# Patient Record
Sex: MALE | Race: WHITE | Employment: UNEMPLOYED | URBAN - NONMETROPOLITAN AREA
[De-identification: names, ages, dates, MRNs, and addresses within clinical notes are randomized per-mention and may not be internally consistent; named-entity substitution may affect disease eponyms.]

---

## 2020-02-12 ENCOUNTER — TELEPHONE (OUTPATIENT)
Dept: SURGERY | Age: 55
End: 2020-02-12

## 2020-02-12 NOTE — TELEPHONE ENCOUNTER
stool  [] [place]   [] Change in bowel habits    Do you have allergies? [x] Yes  If yes, please list: Benadryl  [] No    Do you take Warfarin, Coumadin, Plavix, Eliquis, Xarelto, or aspirin OR do you take a medication that thins your blood? [] Yes  [x] No    Please list all of your medications including over-the-counter and herbal supplements  zyrtec singulair                          List your past surgical procedures    Back surgery                            Medical History  Do you have any history of:  [] None [] Heart Disease [] Hypertension  [] Diabetes [] Seizures [] Respiratory/Asthma  [] Sleep Apnea [] G.E.R.D [] Blood Disorder  [] Vascular Disease [] Depression    List the medical problems you are being treated for    NO                            History of MRSA? NO        Have you check with your insurance company to see what you BENEFITS are id you have had a colonoscopy? If you have not check with them we encourage you to find this information out before the procedure. Below are codes you may need to five them.        CPT and Diagnosis: FOR OFFICE USE ONLY  36879 Diagnostic colonoscopy- All patients Symptoms (check above or list):   87877 Screening colonoscopy for NON-MEDICARE patients Diagnosis code: Z12.11   Screening colonoscopy for MEDICARE patients Diagnosis code: Z12.11   Screening colonoscopy for MEDICARE- HIGH RISK PATIENTS   Z85.038- Personal history malignant neoplasm, large intestine   Z85.048- Personal history malignant neoplasm, rectum/anus   Z86.010- Personal history colon polyps   Z80.0- Family history malignant neoplasm   Z83.79- Family history digestive disorder    Reviewed by nurse: Venu Henry FOR 03/27/2020        ADDITIONAL NOTES:

## 2020-02-26 RX ORDER — CETIRIZINE HYDROCHLORIDE 10 MG/1
10 TABLET ORAL DAILY
COMMUNITY

## 2020-02-26 RX ORDER — MONTELUKAST SODIUM 10 MG/1
10 TABLET ORAL NIGHTLY
COMMUNITY

## 2021-02-09 ENCOUNTER — TELEPHONE (OUTPATIENT)
Dept: SURGERY | Age: 56
End: 2021-02-09

## 2021-02-09 DIAGNOSIS — Z12.11 ENCOUNTER FOR SCREENING COLONOSCOPY: ICD-10-CM

## 2021-02-09 DIAGNOSIS — Z01.818 PRE-OP TESTING: Primary | ICD-10-CM

## 2021-02-09 NOTE — TELEPHONE ENCOUNTER
[facility] Colonoscopy Worksheet     Patient Name: Snow Stuart  : 1965  Primary Care Physician: No primary care provider on file. Today's Date: 2020     Surgery Location:   []? [place]     []? [facility]     [x]? [facility]         []? [address]     Why has a Colonoscopy been recommended for you? Never had one      To properly code your procedure we must ask the following questions. PLEASE CHECK ALL THAT APPLY.      Are you currently having any of the following symptoms?     []? Rectal Bleeding (K62.5)     []? Bloody Stool (K92.1)          []? Dark Tarry Stool (K92.1)  []? Fecal Occult Positive Blood (confirmed by blood test R19.5)  []? Anemia (low hemoglobin D64.9)   []? Abdominal Pain (R10.84)      []? Diarrhea (R19.7)     **If you have any of these symptoms your colonoscopy is diagnostic and must be coded as such. It will not be coded as a screening colonoscopy.      If you have no symptoms but have a personal history of polyps or a family history of colon cancer you fall in the high risk-screening category and we need to know more information. If you have had a polyp or polyps removed at your last colonoscopy then the first scope after that is considered diagnostic. Also if you have irritable bowel syndrome Chron's disease, diverticulitis or colon cancer then the scope would be a diagnostic colonoscopy.      Have you ever had a colonoscopy?     []? Yes  [x]? No     If so, where and when was that done?      Was anything found during the last scope?      Was it removed?      Do you have a family history of colon cancer? NO        Have you personally been diagnosed with colon cancer?     Any tobacco use? []? Yes    [x]? No     Any alcohol use? []? Yes    [x]? No  If yes, how often?      In the last six (6) months have you experienced any of the following symptoms? []? Blood from the rectum or stool                  []? Abdominal Pain   []?  Diarrhea []? Itching of rectum   []? Vomiting                                                    []? Constipation   []? Black stools                                               []? Bloating   []? Mucous in your stool                                 []? [place]   []? Change in bowel habits     Do you have allergies? [x]? Yes                         If yes, please list: Benadryl  []? No     Do you take Warfarin, Coumadin, Plavix, Eliquis, Xarelto, or aspirin OR do you take a medication that thins your blood? []? Yes  [x]? No     Please list all of your medications including over-the-counter and herbal supplements  zyrtec singulair                                     List your past surgical procedures     Back surgery                                       Medical History  Do you have any history of:  []? None                      []? Heart Disease                    []? Hypertension  []? Diabetes                 []? Seizures                             []? Respiratory/Asthma  []? Sleep Apnea          []? G.E.R.D                             []? Blood Disorder  []? Vascular Disease   []? Depression     List the medical problems you are being treated for     NO                                         History of MRSA? NO           Have you check with your insurance company to see what you BENEFITS are id you have had a colonoscopy? If you have not check with them we encourage you to find this information out before the procedure.  Below are codes you may need to five them.         CPT and Diagnosis: FOR OFFICE USE ONLY  07946 Diagnostic colonoscopy- All patients              Symptoms (check above or list):   51459 Screening colonoscopy for NON-MEDICARE patients       Diagnosis code: Z12.11   Screening colonoscopy for MEDICARE patients                Diagnosis code: Z12.11   Screening colonoscopy for MEDICARE- HIGH RISK PATIENTS              Z85.038- Personal history malignant

## 2021-03-22 LAB — SURGICAL PATHOLOGY REPORT: NORMAL
